# Patient Record
Sex: FEMALE | Race: WHITE | NOT HISPANIC OR LATINO | URBAN - NONMETROPOLITAN AREA
[De-identification: names, ages, dates, MRNs, and addresses within clinical notes are randomized per-mention and may not be internally consistent; named-entity substitution may affect disease eponyms.]

---
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
## 2022-03-20 PROBLEM — J03.90 TONSILLITIS: Status: ACTIVE | Noted: 2020-12-10

## 2022-04-09 ASSESSMENT — VISUAL ACUITY
OD_SC: 20/20-2
OD_SC: 20/20
OS_SC: 20/50-1
OU_SC: 20/20
OU_SC: J1+
OS_SC: 20/40

## 2022-04-09 ASSESSMENT — TONOMETRY
OD_IOP_MMHG: 18
OS_IOP_MMHG: 18
